# Patient Record
(demographics unavailable — no encounter records)

---

## 2017-02-16 NOTE — ROOR
________________________________________________________________________________

Patient Name: Albert Roche            Procedure Date: 2/16/2017 10:48 AM

MRN: [id number]                          Account Number: [id number]

YOB: 1960               Age: 56

Room: [facility]                            Gender: Male

Note Status: Finalized                 

________________________________________________________________________________

 

Procedure:           Colonoscopy

Indications:         Screening for colorectal malignant neoplasm

Providers:           Fransisco HARTMANN MD

Referring MD:        Noemi Parmar

Requesting Provider: 

Medicines:           Monitored Anesthesia Care

Complications:       No immediate complications.

________________________________________________________________________________

Procedure:           Pre-Anesthesia Assessment:

                     - The heart rate, respiratory rate, oxygen saturations, 

                     blood pressure, adequacy of pulmonary ventilation, and 

                     response to care were monitored throughout the procedure.

                     The Colonoscope was introduced through the anus and 

                     advanced to the cecum, identified by appendiceal orifice 

                     and ileocecal valve. The colonoscopy was performed 

                     without difficulty. The patient tolerated the procedure 

                     well. The quality of the bowel preparation was good.

                                                                                

Findings:

     The perianal exam findings include non-thrombosed external hemorrhoids.

     (Exam: Complete, Prep: Good or Excellent.)

     A 4 mm polyp was found in the sigmoid colon. The polyp was sessile. The 

     polyp was removed with a cold snare. Resection and retrieval were 

     complete.

     Multiple medium-mouthed diverticula were found in the sigmoid colon and 

     descending colon. There was evidence of diverticular spasm.

     Non-bleeding internal hemorrhoids were found during retroflexion. The 

     hemorrhoids were medium-sized.

     The exam was otherwise without abnormality on direct and retroflexion 

     views.

                                                                                

Impression:          - Non-bleeding internal and small external hemorrhoids.

                     - One 4 mm polyp in the sigmoid colon, removed with a 

                     cold snare. Resected and retrieved.

                     - Moderate diverticulosis in the sigmoid and descending 

                     colon.

                     - The examination was otherwise normal on direct and 

                     retroflexion views.

Recommendation:      - Telephone endoscopist for pathology results in 2 weeks.

                     - Await pathology results.

                     - If the pathology report reveals adenomatous tissue, 

                     then repeat the colonoscopy for surveillance in 5 years.

                     - If the pathology report indicates hyperplastic polyp, 

                     then repeat colonoscopy for screening purposes in 10 

                     years.

                                                                                

 

Fransisco Hartmann MD

________________

Fransisco HARTMANN MD

2/16/2017 11:11:12 AM

This report has been signed electronically.

Number of Addenda: 0

 

Note Initiated On: 2/16/2017 10:48 AM

Estimated Blood Loss:

     Estimated blood loss: none.